# Patient Record
Sex: MALE | Race: WHITE | NOT HISPANIC OR LATINO | URBAN - METROPOLITAN AREA
[De-identification: names, ages, dates, MRNs, and addresses within clinical notes are randomized per-mention and may not be internally consistent; named-entity substitution may affect disease eponyms.]

---

## 2020-08-17 ENCOUNTER — INPATIENT (INPATIENT)
Facility: HOSPITAL | Age: 50
LOS: 0 days | Discharge: LEFT BEFORE DISCHARGE | End: 2020-08-17
Attending: INTERNAL MEDICINE | Admitting: INTERNAL MEDICINE
Payer: MEDICAID

## 2020-08-17 VITALS
HEART RATE: 100 BPM | OXYGEN SATURATION: 98 % | DIASTOLIC BLOOD PRESSURE: 100 MMHG | RESPIRATION RATE: 18 BRPM | TEMPERATURE: 98 F | SYSTOLIC BLOOD PRESSURE: 150 MMHG

## 2020-08-17 VITALS
SYSTOLIC BLOOD PRESSURE: 125 MMHG | RESPIRATION RATE: 18 BRPM | OXYGEN SATURATION: 98 % | DIASTOLIC BLOOD PRESSURE: 72 MMHG | TEMPERATURE: 98 F | HEART RATE: 98 BPM

## 2020-08-17 DIAGNOSIS — R10.9 UNSPECIFIED ABDOMINAL PAIN: ICD-10-CM

## 2020-08-17 DIAGNOSIS — S86.011A STRAIN OF RIGHT ACHILLES TENDON, INITIAL ENCOUNTER: ICD-10-CM

## 2020-08-17 DIAGNOSIS — Z02.9 ENCOUNTER FOR ADMINISTRATIVE EXAMINATIONS, UNSPECIFIED: ICD-10-CM

## 2020-08-17 DIAGNOSIS — K92.2 GASTROINTESTINAL HEMORRHAGE, UNSPECIFIED: ICD-10-CM

## 2020-08-17 DIAGNOSIS — Z29.9 ENCOUNTER FOR PROPHYLACTIC MEASURES, UNSPECIFIED: ICD-10-CM

## 2020-08-17 LAB
ALBUMIN SERPL ELPH-MCNC: 4.9 G/DL — SIGNIFICANT CHANGE UP (ref 3.3–5)
ALP SERPL-CCNC: 69 U/L — SIGNIFICANT CHANGE UP (ref 40–120)
ALT FLD-CCNC: 43 U/L — HIGH (ref 4–41)
ANION GAP SERPL CALC-SCNC: 15 MMO/L — HIGH (ref 7–14)
APTT BLD: 32.7 SEC — SIGNIFICANT CHANGE UP (ref 27–36.3)
AST SERPL-CCNC: 60 U/L — HIGH (ref 4–40)
BASE EXCESS BLDV CALC-SCNC: 6.9 MMOL/L — SIGNIFICANT CHANGE UP
BASOPHILS # BLD AUTO: 0.07 K/UL — SIGNIFICANT CHANGE UP (ref 0–0.2)
BASOPHILS NFR BLD AUTO: 0.5 % — SIGNIFICANT CHANGE UP (ref 0–2)
BILIRUB SERPL-MCNC: 0.7 MG/DL — SIGNIFICANT CHANGE UP (ref 0.2–1.2)
BLD GP AB SCN SERPL QL: NEGATIVE — SIGNIFICANT CHANGE UP
BLOOD GAS VENOUS - CREATININE: 0.98 MG/DL — SIGNIFICANT CHANGE UP (ref 0.5–1.3)
BLOOD GAS VENOUS - FIO2: 21 — SIGNIFICANT CHANGE UP
BUN SERPL-MCNC: 33 MG/DL — HIGH (ref 7–23)
CALCIUM SERPL-MCNC: 9.8 MG/DL — SIGNIFICANT CHANGE UP (ref 8.4–10.5)
CHLORIDE BLDV-SCNC: 98 MMOL/L — SIGNIFICANT CHANGE UP (ref 96–108)
CHLORIDE SERPL-SCNC: 95 MMOL/L — LOW (ref 98–107)
CO2 SERPL-SCNC: 29 MMOL/L — SIGNIFICANT CHANGE UP (ref 22–31)
CREAT SERPL-MCNC: 0.98 MG/DL — SIGNIFICANT CHANGE UP (ref 0.5–1.3)
EOSINOPHIL # BLD AUTO: 0.14 K/UL — SIGNIFICANT CHANGE UP (ref 0–0.5)
EOSINOPHIL NFR BLD AUTO: 1 % — SIGNIFICANT CHANGE UP (ref 0–6)
GAS PNL BLDV: 139 MMOL/L — SIGNIFICANT CHANGE UP (ref 136–146)
GLUCOSE BLDV-MCNC: 109 MG/DL — HIGH (ref 70–99)
GLUCOSE SERPL-MCNC: 112 MG/DL — HIGH (ref 70–99)
HCO3 BLDV-SCNC: 28 MMOL/L — HIGH (ref 20–27)
HCT VFR BLD CALC: 43.9 % — SIGNIFICANT CHANGE UP (ref 39–50)
HCT VFR BLDV CALC: 46.3 % — SIGNIFICANT CHANGE UP (ref 39–51)
HGB BLD-MCNC: 14.4 G/DL — SIGNIFICANT CHANGE UP (ref 13–17)
HGB BLDV-MCNC: 15.1 G/DL — SIGNIFICANT CHANGE UP (ref 13–17)
IMM GRANULOCYTES NFR BLD AUTO: 0.4 % — SIGNIFICANT CHANGE UP (ref 0–1.5)
INR BLD: 1.12 — SIGNIFICANT CHANGE UP (ref 0.88–1.16)
LACTATE BLDV-MCNC: 1 MMOL/L — SIGNIFICANT CHANGE UP (ref 0.5–2)
LIDOCAIN IGE QN: 24.4 U/L — SIGNIFICANT CHANGE UP (ref 7–60)
LYMPHOCYTES # BLD AUTO: 23.8 % — SIGNIFICANT CHANGE UP (ref 13–44)
LYMPHOCYTES # BLD AUTO: 3.21 K/UL — SIGNIFICANT CHANGE UP (ref 1–3.3)
MCHC RBC-ENTMCNC: 28.6 PG — SIGNIFICANT CHANGE UP (ref 27–34)
MCHC RBC-ENTMCNC: 32.8 % — SIGNIFICANT CHANGE UP (ref 32–36)
MCV RBC AUTO: 87.1 FL — SIGNIFICANT CHANGE UP (ref 80–100)
MONOCYTES # BLD AUTO: 1.35 K/UL — HIGH (ref 0–0.9)
MONOCYTES NFR BLD AUTO: 10 % — SIGNIFICANT CHANGE UP (ref 2–14)
NEUTROPHILS # BLD AUTO: 8.69 K/UL — HIGH (ref 1.8–7.4)
NEUTROPHILS NFR BLD AUTO: 64.3 % — SIGNIFICANT CHANGE UP (ref 43–77)
NRBC # FLD: 0 K/UL — SIGNIFICANT CHANGE UP (ref 0–0)
PCO2 BLDV: 60 MMHG — HIGH (ref 41–51)
PH BLDV: 7.35 PH — SIGNIFICANT CHANGE UP (ref 7.32–7.43)
PLATELET # BLD AUTO: 268 K/UL — SIGNIFICANT CHANGE UP (ref 150–400)
PMV BLD: 10.8 FL — SIGNIFICANT CHANGE UP (ref 7–13)
PO2 BLDV: 29 MMHG — LOW (ref 35–40)
POTASSIUM BLDV-SCNC: 3.2 MMOL/L — LOW (ref 3.4–4.5)
POTASSIUM SERPL-MCNC: 3.5 MMOL/L — SIGNIFICANT CHANGE UP (ref 3.5–5.3)
POTASSIUM SERPL-SCNC: 3.5 MMOL/L — SIGNIFICANT CHANGE UP (ref 3.5–5.3)
PROT SERPL-MCNC: 7.4 G/DL — SIGNIFICANT CHANGE UP (ref 6–8.3)
PROTHROM AB SERPL-ACNC: 12.7 SEC — SIGNIFICANT CHANGE UP (ref 10.6–13.6)
RBC # BLD: 5.04 M/UL — SIGNIFICANT CHANGE UP (ref 4.2–5.8)
RBC # FLD: 13.1 % — SIGNIFICANT CHANGE UP (ref 10.3–14.5)
RH IG SCN BLD-IMP: POSITIVE — SIGNIFICANT CHANGE UP
SAO2 % BLDV: 55.9 % — LOW (ref 60–85)
SARS-COV-2 RNA SPEC QL NAA+PROBE: SIGNIFICANT CHANGE UP
SODIUM SERPL-SCNC: 139 MMOL/L — SIGNIFICANT CHANGE UP (ref 135–145)
WBC # BLD: 13.51 K/UL — HIGH (ref 3.8–10.5)
WBC # FLD AUTO: 13.51 K/UL — HIGH (ref 3.8–10.5)

## 2020-08-17 PROCEDURE — 99223 1ST HOSP IP/OBS HIGH 75: CPT | Mod: GC

## 2020-08-17 PROCEDURE — 74177 CT ABD & PELVIS W/CONTRAST: CPT | Mod: 26

## 2020-08-17 PROCEDURE — 99285 EMERGENCY DEPT VISIT HI MDM: CPT

## 2020-08-17 RX ORDER — SODIUM CHLORIDE 9 MG/ML
1000 INJECTION INTRAMUSCULAR; INTRAVENOUS; SUBCUTANEOUS ONCE
Refills: 0 | Status: COMPLETED | OUTPATIENT
Start: 2020-08-17 | End: 2020-08-17

## 2020-08-17 RX ORDER — MORPHINE SULFATE 50 MG/1
4 CAPSULE, EXTENDED RELEASE ORAL ONCE
Refills: 0 | Status: DISCONTINUED | OUTPATIENT
Start: 2020-08-17 | End: 2020-08-17

## 2020-08-17 RX ORDER — OXYCODONE HYDROCHLORIDE 5 MG/1
0 TABLET ORAL
Qty: 0 | Refills: 0 | DISCHARGE

## 2020-08-17 RX ORDER — NICOTINE POLACRILEX 2 MG
1 GUM BUCCAL DAILY
Refills: 0 | Status: DISCONTINUED | OUTPATIENT
Start: 2020-08-17 | End: 2020-08-17

## 2020-08-17 RX ORDER — OXYCODONE HYDROCHLORIDE 5 MG/1
30 TABLET ORAL EVERY 8 HOURS
Refills: 0 | Status: DISCONTINUED | OUTPATIENT
Start: 2020-08-17 | End: 2020-08-17

## 2020-08-17 RX ORDER — PANTOPRAZOLE SODIUM 20 MG/1
8 TABLET, DELAYED RELEASE ORAL
Qty: 80 | Refills: 0 | Status: DISCONTINUED | OUTPATIENT
Start: 2020-08-17 | End: 2020-08-17

## 2020-08-17 RX ORDER — ONDANSETRON 8 MG/1
4 TABLET, FILM COATED ORAL ONCE
Refills: 0 | Status: COMPLETED | OUTPATIENT
Start: 2020-08-17 | End: 2020-08-17

## 2020-08-17 RX ORDER — MORPHINE SULFATE 50 MG/1
2 CAPSULE, EXTENDED RELEASE ORAL ONCE
Refills: 0 | Status: DISCONTINUED | OUTPATIENT
Start: 2020-08-17 | End: 2020-08-17

## 2020-08-17 RX ORDER — FAMOTIDINE 10 MG/ML
20 INJECTION INTRAVENOUS ONCE
Refills: 0 | Status: COMPLETED | OUTPATIENT
Start: 2020-08-17 | End: 2020-08-17

## 2020-08-17 RX ADMIN — MORPHINE SULFATE 2 MILLIGRAM(S): 50 CAPSULE, EXTENDED RELEASE ORAL at 12:25

## 2020-08-17 RX ADMIN — SODIUM CHLORIDE 1000 MILLILITER(S): 9 INJECTION INTRAMUSCULAR; INTRAVENOUS; SUBCUTANEOUS at 09:44

## 2020-08-17 RX ADMIN — Medication 1 PATCH: at 15:45

## 2020-08-17 RX ADMIN — MORPHINE SULFATE 4 MILLIGRAM(S): 50 CAPSULE, EXTENDED RELEASE ORAL at 10:40

## 2020-08-17 RX ADMIN — FAMOTIDINE 20 MILLIGRAM(S): 10 INJECTION INTRAVENOUS at 09:44

## 2020-08-17 RX ADMIN — SODIUM CHLORIDE 1000 MILLILITER(S): 9 INJECTION INTRAMUSCULAR; INTRAVENOUS; SUBCUTANEOUS at 12:25

## 2020-08-17 RX ADMIN — Medication 1 PATCH: at 15:15

## 2020-08-17 RX ADMIN — MORPHINE SULFATE 4 MILLIGRAM(S): 50 CAPSULE, EXTENDED RELEASE ORAL at 09:44

## 2020-08-17 RX ADMIN — PANTOPRAZOLE SODIUM 10 MG/HR: 20 TABLET, DELAYED RELEASE ORAL at 12:25

## 2020-08-17 RX ADMIN — MORPHINE SULFATE 2 MILLIGRAM(S): 50 CAPSULE, EXTENDED RELEASE ORAL at 11:01

## 2020-08-17 RX ADMIN — ONDANSETRON 4 MILLIGRAM(S): 8 TABLET, FILM COATED ORAL at 09:43

## 2020-08-17 NOTE — DISCHARGE NOTE PROVIDER - NSDCCPCAREPLAN_GEN_ALL_CORE_FT
PRINCIPAL DISCHARGE DIAGNOSIS  Diagnosis: Hematemesis  Assessment and Plan of Treatment: This is secondary to what is likely an upper GI bleed. We are concerned for cancer given CT scan results. It has been recommended that you undergo EGD/EUS scoping to look at the GI tract.

## 2020-08-17 NOTE — H&P ADULT - ATTENDING COMMENTS
Patient was seen and examined personally by me. I have discussed the plan and reviewed the Resident's note and agree with the above physical exam findings including assessment and plan except as indicated below. Labs and imagining reviewed.     50M with no PMH presented with hematemesis/coffee ground emesis. First occurrence, endorsed history of GERD. +FH malignancy. denied dysphagia or weight loss. GI input appreciated. monitor h/h, PPI. plan for EGD tomorrow. Patient states he does not want to stay at the hospital. His GI symptoms had resolved. he wants to follow up with his PCP. I explained to him that he has not had any repeated blood work and he could still be bleeding internally. Patient is alert and oriented. he understanding the risk of leaving the hospital against medical advice, including bleeding out and possibility of death. He reports understanding. AMA form signed with LAI Florez as witness. IV removed, patient left AMA.

## 2020-08-17 NOTE — H&P ADULT - ASSESSMENT
Patient is 50yoM with no major PMHx, 30 pack/year smoking history and family history of cancer here with epigastric pain, hematemesis, and melena. Concern for UGIB, ulcer vs malignancy, vs mucosal tear. Malignancy supported by CT scan with prominent  gastrohepatic LNs and questionable RLL pulmonary nodule noted on CT a/p

## 2020-08-17 NOTE — ED ADULT NURSE NOTE - OBJECTIVE STATEMENT
50-year-old male ambulatory presents to ED complaining of epigastric pain since last night.  Pt states he vomited ~10x last night, the last episode was "black."  Pt states he had a pelvic fracture in 2007 with mesh placed for hernia repair, now stating "I think the mesh broke."  Denies fever, chills, chest pain.

## 2020-08-17 NOTE — ED PROVIDER NOTE - ATTENDING CONTRIBUTION TO CARE
I performed a history and physical exam of the patient and discussed their management with the PA. I reviewed the PA's note and agree with the documented findings and plan of care. I have edited as appropriate. My medical decision making and observations are found above.  49 y/o male no pmh c/o abd pain x1 day. Pt admits to epigastric/LUQ abd pain, worse with palpation, no relieving factors. pt admits to nausea and multiple episodes of emesis. Pt states that at first the emesis was normal color but the last episode was black. Pt states that he had a normal BM yesterday. Pt admits to drinking ETOH about once every 3 weeks. Pt denies chest pain, sob, diarrhea, dysuria, numbness, tingling, weakness, dizziness, syncope, fever or chills.

## 2020-08-17 NOTE — DISCHARGE NOTE PROVIDER - HOSPITAL COURSE
HPI:    Patient is a 51yo M with recent achilles tendon injury, no other Hx, presenting for blood-tinged emesis x1 and black, tarry emesis x1 and melena x1 yesterday evening accompanied by epigastric pain. Says a few emetic episodes were violent. Patient recently at [facility], which he thinks may be the source of his n/v, despite no one else reporting illness. Patient denies sick contacts, history of ulcers, family history of GI cancers. Patient does have a significant family history of other malignancy in the family. 30-pack year smoker, some EtOH use. Denies recent trauma, ingestion, fever, chills. Last BM yesterday with loose, tarry black BM. Some abdominal discomfort in right and left colonic regions today. (17 Aug 2020 15:18)        Started on protonix gtt. Seen by GI, who are concerned for SCC. Recommended EGD/EUS. Patient decided to discuss with karlie at home, and was angry he couldn't smoke in the hospital. Signed out AMA.         Patient signed out AMA from hospital on 8/17 in stable condition.

## 2020-08-17 NOTE — H&P ADULT - NSHPREVIEWOFSYSTEMS_GEN_ALL_CORE
REVIEW OF SYSTEMS      General:	-distress, intoxication    Skin/Breast: -rash, lesions  	  Ophthalmologic: -changes in vison, pain  	  ENMT: -sore throat, bleeding    Respiratory and Thorax: -SOB, cough  	  Cardiovascular: -chest pain, palpitations    Gastrointestinal:	+episgastric and lateral right and left pain, n/v/d, melena, hematemesis; -jaundice    Genitourinary: -changes in urinary habits, pain    Musculoskeletal:	+pain from R achilles injury; -other trauma, pain    Neurological: -headache, weakness

## 2020-08-17 NOTE — ED ADULT TRIAGE NOTE - CHIEF COMPLAINT QUOTE
p/t c/o of diffuse abd pain with nausea and vomiting since last night, p/t very poor historian, nad noted

## 2020-08-17 NOTE — H&P ADULT - NSHPPHYSICALEXAM_GEN_ALL_CORE
PHYSICAL EXAM:      Constitutional: NAD, well-developed male    Eyes: No conjunctival injection, EOMI, PERRLA    ENMT: No oropharyngeal erythema, normal nose and ears    Respiratory: CTAB, no wheezing, rhonchi. Good inspiratory effort.     Cardiovascular: RRR, no murmurs, rubs, gallops. Limbs warm and well-perfused. 2+ pulses b/l extremities    Gastrointestinal: Soft, mildly tender to deep palpation in right and left lateral colonic regions. Normoactive bowel sounds. Ventral abdominal wall hernia, reducible, bowel sounds.     Neurological: No focal deficits, moving all limbs.     Skin: No rashes, lesions    Musculoskeletal: Swelling in RLE 2/2 to achilles tendon injury. No gross deformity    Psychiatric: AOx3, normal affect

## 2020-08-17 NOTE — H&P ADULT - HISTORY OF PRESENT ILLNESS
Patient is a 49yo M with recent achilles tendon injury, no other Hx, presenting for blood-tinged emesis x1 and black, tarry emesis x1 and melena x1 yesterday evening accompanied by epigastric pain. Says a few emetic episodes were violent. Patient recently at [facility], which he thinks may be the source of his n/v, despite no one else reporting illness. Patient denies sick contacts, history of ulcers, family history of GI cancers. Patient does have a significant family history of other malignancy in the family. 30-pack year smoker, some EtOH use. Denies recent trauma, ingestion, fever, chills. Last BM yesterday with loose, tarry black BM. Some abdominal discomfort in right and left colonic regions today.

## 2020-08-17 NOTE — H&P ADULT - PROBLEM SELECTOR PLAN 4
Dispo:   1.  Name of PCP:   2.  PCP Contacted on Admission: [ ] Y    [ ] N    3.  PCP contacted at Discharge: [ ] Y    [ ] N    [ ] N/A  4.  Post-Discharge Appointment Date and Location:  5.  Summary of Handoff given to PCP: Dispo:   1.  Name of PCP: Effie Art ([phone])  2.  PCP Contacted on Admission: [ ] Y    [x] N    3.  PCP contacted at Discharge: [ ] Y    [ ] N    [ ] N/A  4.  Post-Discharge Appointment Date and Location:  5.  Summary of Handoff given to PCP:

## 2020-08-17 NOTE — CONSULT NOTE ADULT - ASSESSMENT
Impression:  1) Esophogeal wall thickening -     Recommendations:   - monitor hemoglobin   - transfuse as needed   - two active IVs at all times   - active type and screen   - keep NPO for now   - IV PPI BID   - supportive per primary    Rosario Koo  Gastroenterology Fellow  Pager x [phone] or [phone]  (From 8 am - 5 pm or on weekends and holidays please page GI on call at [phone]) Impression:  1) Esophogeal wall thickening - given significant smoking history and gastric nodes concerning for squamous cell carcinoma.  DDx also includes esophagitis and other esophogeal malignancies such as adenocarcinoma.    Recommendations:   - monitor hemoglobin   - transfuse as needed   - two active IVs at all times   - active type and screen   - keep NPO for now   - IV PPI BID   - supportive per primary   - would recommend EGD +/- EUS tomorrow      Rosario Koo  Gastroenterology Fellow  Pager x [phone] or [phone]  (From 8 am - 5 pm or on weekends and holidays please page GI on call at [phone])

## 2020-08-17 NOTE — CONSULT NOTE ADULT - ATTENDING COMMENTS
Esophageal wall thickening. Nausea vomiting, periesophageal lymph node.    Rule out esophageal CA. Rule out esophagitis, rule out Aggie-Macdonald tear.    Plan: EGD with possible EUS.

## 2020-08-17 NOTE — H&P ADULT - NSHPLABSRESULTS_GEN_ALL_CORE
14.4   13.51 )-----------( 268      ( 17 Aug 2020 09:30 )             43.9     08-17    139  |  95<L>  |  33<H>  ----------------------------<  112<H>  3.5   |  29  |  0.98    Ca    9.8      17 Aug 2020 09:30    TPro  7.4  /  Alb  4.9  /  TBili  0.7  /  DBili  x   /  AST  60<H>  /  ALT  43<H>  /  AlkPhos  69  08-17    EXAM:  CT ABDOMEN AND PELVIS IC        PROCEDURE DATE:  Aug 17 2020         INTERPRETATION:  CLINICAL INFORMATION: Abdominal pain and vomiting.    COMPARISON: None.    PROCEDURE:  CT of the Abdomen and Pelvis was performed with intravenous contrast.  Intravenous contrast: 90 ml Omnipaque 350. 10 ml discarded.  Oral contrast: None.  Sagittal and coronal reformats were performed.    FINDINGS:  LOWER CHEST: 8 mm right lower lobe pulmonary nodule (2, 13).    LIVER: Within normal limits.  BILE DUCTS: Normal caliber.  GALLBLADDER: Within normal limits.  SPLEEN: Within normal limits.  PANCREAS: Within normal limits.  ADRENALS: Within normal limits.  KIDNEYS/URETERS: Within normal limits.    BLADDER: Within normal limits.  REPRODUCTIVE ORGANS: Prostate within normal limits.    BOWEL: Wall thickening of the distal esophagus. No bowel obstruction. Colonic diverticulosis without diverticulitis. Appendix is not visualized. No evidence of inflammation in the pericecal region.  PERITONEUM: No ascites.  VESSELS: Within normal limits.  RETROPERITONEUM/LYMPH NODES: Prominent gastrohepatic lymph nodes, measuring up to 1 cm in short axis (2, 31).  ABDOMINAL WALL: Status post right inguinal hernia repair.  BONES: Degenerative changes. Chronic right pubic bone fracture deformity. Nonspecific sclerotic foci in the pelvic bones and femurs.    IMPRESSION:    Distal esophageal wall thickening and prominent gastrohepatic lymph nodes. Correlation with endoscopy is recommended to exclude neoplasm.    8mm right lower lobe pulmonary nodule. Chest CT can be performed for further evaluation.

## 2020-08-17 NOTE — ED ADULT NURSE NOTE - NSIMPLEMENTINTERV_GEN_ALL_ED
Implemented All Universal Safety Interventions:  [place] to call system. Call bell, personal items and telephone within reach. Instruct patient to call for assistance. Room bathroom lighting operational. Non-slip footwear when patient is off stretcher. Physically safe environment: no spills, clutter or unnecessary equipment. Stretcher in lowest position, wheels locked, appropriate side rails in place.

## 2020-08-17 NOTE — CONSULT NOTE ADULT - SUBJECTIVE AND OBJECTIVE BOX
Chief Complaint:  Patient is a 50y old  Male who presents with a chief complaint of     HPI:  The patient is a 51 y/o male no PMHx c/o abd pain x1 day. The patient notes that he ahd one day of   epigastric/LUQ abd pain, worse with palpation, no relieving factors with associated nausea and multiple episodes of emesis. Pt states that at first the emesis was normal color but the last episode was black in color. Pt states that he had a normal BM yesterday. Pt admits to drinking ETOH about once every 3 weeks. Pt denies chest pain, sob, diarrhea, dysuria, numbness, tingling, weakness, dizziness, syncope, fever or chills.    In the ED VSS hemoglobin     Allergies:  No Known Allergies      Home Medications:    Hospital Medications:  nicotine - 21 mG/24Hr(s) Patch 1 patch Transdermal daily  pantoprazole Infusion 8 mG/Hr IV Continuous <Continuous>      PMHX/PSHX:  Inguinal hernia  H/O hernia repair  Pelvic fracture      Family history:      Social History:     ROS:     General:  No wt loss, fevers, chills, night sweats, fatigue,   Eyes:  Good vision, no reported pain  ENT:  No sore throat, pain, runny nose, dysphagia  CV:  No pain, palpitations, hypo/hypertension  Resp:  No dyspnea, cough, tachypnea, wheezing  GI:  See HPI  :  No pain, bleeding, incontinence, nocturia  Muscle:  No pain, weakness  Neuro:  No weakness, tingling, memory problems  Psych:  No fatigue, insomnia, mood problems, depression  Endocrine:  No polyuria, polydipsia, cold/heat intolerance  Heme:  No petechiae, ecchymosis, easy bruisability  Skin:  No rash, edema      PHYSICAL EXAM:     GENERAL:  NAD  CHEST:  Full & symmetric excursion  HEART:  Regular rhythm, no abdominal bruit, no edema  ABDOMEN:  Soft, non-tender, non-distended, normoactive bowel sounds,  no masses , no hepatosplenomegaly  EXTREMITIES:  no cyanosis,clubbing or edema  SKIN:  No rash/erythema/ecchymoses/petechiae/wounds/abscess/warm/dry  NEURO:  Alert, oriented    Vital Signs:  Vital Signs Last 24 Hrs  T(C): 36.7 (17 Aug 2020 13:38), Max: 36.9 (17 Aug 2020 08:28)  T(F): 98 (17 Aug 2020 13:38), Max: 98.5 (17 Aug 2020 08:28)  HR: 98 (17 Aug 2020 13:38) (88 - 100)  BP: 125/72 (17 Aug 2020 13:38) (125/72 - 156/96)  BP(mean): --  RR: 18 (17 Aug 2020 13:38) (18 - 20)  SpO2: 98% (17 Aug 2020 13:38) (95% - 98%)  Daily     Daily     LABS:                        14.4   13.51 )-----------( 268      ( 17 Aug 2020 09:30 )             43.9     08-17    139  |  95<L>  |  33<H>  ----------------------------<  112<H>  3.5   |  29  |  0.98    Ca    9.8      17 Aug 2020 09:30    TPro  7.4  /  Alb  4.9  /  TBili  0.7  /  DBili  x   /  AST  60<H>  /  ALT  43<H>  /  AlkPhos  69  08-17    LIVER FUNCTIONS - ( 17 Aug 2020 09:30 )  Alb: 4.9 g/dL / Pro: 7.4 g/dL / ALK PHOS: 69 u/L / ALT: 43 u/L / AST: 60 u/L / GGT: x           PT/INR - ( 17 Aug 2020 09:30 )   PT: 12.7 SEC;   INR: 1.12          PTT - ( 17 Aug 2020 09:30 )  PTT:32.7 SEC    Amylase Serum--      Lipase serum24.4       Ammonia--      Imaging: Chief Complaint:  Patient is a 50y old  Male who presents with a chief complaint of     HPI:  The patient is a 51 y/o male no PMHx c/o abd pain x1 day. The patient notes that he ahd one day of   epigastric/LUQ abd pain, worse with palpation, no relieving factors with associated nausea and multiple episodes of emesis. Pt states that at first the emesis was normal color but the last episode was black in color. Pt states that he had a normal BM yesterday. Pt admits to drinking ETOH about once every 3 weeks. Pt denies chest pain, sob, diarrhea, dysuria, numbness, tingling, weakness, dizziness, syncope, fever or chills.    In the ED VSS hemoglobin 14.4 BUN of 33.  Patient had a CT with Distal esophageal wall thickening and prominent gastrohepatic lymph nodes. Correlation with endoscopy is recommended to exclude neoplasm. 8mm right lower lobe pulmonary nodule. Chest CT can be performed for further evaluation.    Allergies:  No Known Allergies      Home Medications:    Hospital Medications:  nicotine - 21 mG/24Hr(s) Patch 1 patch Transdermal daily  pantoprazole Infusion 8 mG/Hr IV Continuous <Continuous>      PMHX/PSHX:  Inguinal hernia  H/O hernia repair  Pelvic fracture      Family history:      Social History:     ROS:     General:  No wt loss, fevers, chills, night sweats, fatigue,   Eyes:  Good vision, no reported pain  ENT:  No sore throat, pain, runny nose, dysphagia  CV:  No pain, palpitations, hypo/hypertension  Resp:  No dyspnea, cough, tachypnea, wheezing  GI:  See HPI  :  No pain, bleeding, incontinence, nocturia  Muscle:  No pain, weakness  Neuro:  No weakness, tingling, memory problems  Psych:  No fatigue, insomnia, mood problems, depression  Endocrine:  No polyuria, polydipsia, cold/heat intolerance  Heme:  No petechiae, ecchymosis, easy bruisability  Skin:  No rash, edema      PHYSICAL EXAM:     GENERAL:  NAD  CHEST:  Full & symmetric excursion  HEART:  Regular rhythm, no abdominal bruit, no edema  ABDOMEN:  Soft, non-tender, non-distended, normoactive bowel sounds,  no masses , no hepatosplenomegaly  EXTREMITIES:  no cyanosis,clubbing or edema  SKIN:  No rash/erythema/ecchymoses/petechiae/wounds/abscess/warm/dry  NEURO:  Alert, oriented    Vital Signs:  Vital Signs Last 24 Hrs  T(C): 36.7 (17 Aug 2020 13:38), Max: 36.9 (17 Aug 2020 08:28)  T(F): 98 (17 Aug 2020 13:38), Max: 98.5 (17 Aug 2020 08:28)  HR: 98 (17 Aug 2020 13:38) (88 - 100)  BP: 125/72 (17 Aug 2020 13:38) (125/72 - 156/96)  BP(mean): --  RR: 18 (17 Aug 2020 13:38) (18 - 20)  SpO2: 98% (17 Aug 2020 13:38) (95% - 98%)  Daily     Daily     LABS:                        14.4   13.51 )-----------( 268      ( 17 Aug 2020 09:30 )             43.9     08-17    139  |  95<L>  |  33<H>  ----------------------------<  112<H>  3.5   |  29  |  0.98    Ca    9.8      17 Aug 2020 09:30    TPro  7.4  /  Alb  4.9  /  TBili  0.7  /  DBili  x   /  AST  60<H>  /  ALT  43<H>  /  AlkPhos  69  08-17    LIVER FUNCTIONS - ( 17 Aug 2020 09:30 )  Alb: 4.9 g/dL / Pro: 7.4 g/dL / ALK PHOS: 69 u/L / ALT: 43 u/L / AST: 60 u/L / GGT: x           PT/INR - ( 17 Aug 2020 09:30 )   PT: 12.7 SEC;   INR: 1.12          PTT - ( 17 Aug 2020 09:30 )  PTT:32.7 SEC    Amylase Serum--      Lipase serum24.4       Ammonia--      Imaging: Chief Complaint:  Patient is a 50y old  Male who presents with a chief complaint of     HPI:  The patient is a 49 y/o male no PMHx c/o abd pain x1 day. The patient notes that he was in his [facility] until yesterday when he went to a [facility], he ate a pink suasage and then 2 hours later started having N/V and abdominal pain.  No one else was sick and he has no recent travel.  He stated that the pain worse with palpation, no relieving factors with associated nausea and multiple episodes of emesis all night with two loose BMs. Pt states that at first the emesis was normal color but the last episode was black in color, does not think there was blood.  Pt admits to drinking ETOH about once every 3 weeks. Pt denies chest pain, sob, diarrhea, dysuria, numbness, tingling, weakness, dizziness, syncope, fever or chills.  Of note the patient has a 30 pack year smoking history and is currently smoking.  His family history is significant for breast and bone cancer on his mothers side.  Patient denies any dysphagia, early satiety or weight loss.    In the ED VSS hemoglobin 14.4 BUN of 33.  Patient had a CT with Distal esophageal wall thickening and prominent gastrohepatic lymph nodes. Correlation with endoscopy is recommended to exclude neoplasm. 8mm right lower lobe pulmonary nodule. Chest CT can be performed for further evaluation.  Patient is insistent that he wants to leave AMA because his wife is under a lot of stress.  Of note he ate a full breakfast without issue.     Allergies:  No Known Allergies      Home Medications:    Hospital Medications:  nicotine - 21 mG/24Hr(s) Patch 1 patch Transdermal daily  pantoprazole Infusion 8 mG/Hr IV Continuous <Continuous>      PMHX/PSHX:  Inguinal hernia  H/O hernia repair  Pelvic fracture      Family history:      Social History:     ROS:     General:  No wt loss, fevers, chills, night sweats, fatigue,   Eyes:  Good vision, no reported pain  ENT:  No sore throat, pain, runny nose, dysphagia  CV:  No pain, palpitations, hypo/hypertension  Resp:  No dyspnea, cough, tachypnea, wheezing  GI:  See HPI  :  No pain, bleeding, incontinence, nocturia  Muscle:  No pain, weakness  Neuro:  No weakness, tingling, memory problems  Psych:  No fatigue, insomnia, mood problems, depression  Endocrine:  No polyuria, polydipsia, cold/heat intolerance  Heme:  No petechiae, ecchymosis, easy bruisability  Skin:  No rash, edema      PHYSICAL EXAM:     GENERAL:  NAD  CHEST:  Full & symmetric excursion  HEART:  Regular rhythm, no abdominal bruit, no edema  ABDOMEN:  Soft, non-tender, non-distended, normoactive bowel sounds,  no masses , no hepatosplenomegaly  EXTREMITIES:  no cyanosis,clubbing or edema  SKIN:  No rash/erythema/ecchymoses/petechiae/wounds/abscess/warm/dry  NEURO:  Alert, oriented    Vital Signs:  Vital Signs Last 24 Hrs  T(C): 36.7 (17 Aug 2020 13:38), Max: 36.9 (17 Aug 2020 08:28)  T(F): 98 (17 Aug 2020 13:38), Max: 98.5 (17 Aug 2020 08:28)  HR: 98 (17 Aug 2020 13:38) (88 - 100)  BP: 125/72 (17 Aug 2020 13:38) (125/72 - 156/96)  BP(mean): --  RR: 18 (17 Aug 2020 13:38) (18 - 20)  SpO2: 98% (17 Aug 2020 13:38) (95% - 98%)  Daily     Daily     LABS:                        14.4   13.51 )-----------( 268      ( 17 Aug 2020 09:30 )             43.9     08-17    139  |  95<L>  |  33<H>  ----------------------------<  112<H>  3.5   |  29  |  0.98    Ca    9.8      17 Aug 2020 09:30    TPro  7.4  /  Alb  4.9  /  TBili  0.7  /  DBili  x   /  AST  60<H>  /  ALT  43<H>  /  AlkPhos  69  08-17    LIVER FUNCTIONS - ( 17 Aug 2020 09:30 )  Alb: 4.9 g/dL / Pro: 7.4 g/dL / ALK PHOS: 69 u/L / ALT: 43 u/L / AST: 60 u/L / GGT: x           PT/INR - ( 17 Aug 2020 09:30 )   PT: 12.7 SEC;   INR: 1.12          PTT - ( 17 Aug 2020 09:30 )  PTT:32.7 SEC    Amylase Serum--      Lipase serum24.4       Ammonia--      Imaging:

## 2020-08-17 NOTE — ED PROVIDER NOTE - OBJECTIVE STATEMENT
49 y/o male no pmh c/o abd pain x1 day. Pt admits to epigastric/LUQ abd pain, worse with palpation, no relieving factors. pt admits to nausea and multiple episodes of emesis. Pt states that at first the emesis was normal color but the last episode was black. Pt states that he had a normal BM yesterday. Pt admits to drinking ETOH about once every 3 weeks. Pt denies chest pain, sob, diarrhea, dysuria, numbness, tingling, weakness, dizziness, syncope, fever or chills.

## 2020-08-17 NOTE — CHART NOTE - NSCHARTNOTEFT_GEN_A_CORE
Gastroenterology Brief Note   - spoke with patient extensively about need to stay for EGD tomorrow   - patient wants to leave AMA   - told patient in best benefit to stay, not willing to consider

## 2020-10-15 ENCOUNTER — EMERGENCY (EMERGENCY)
Facility: HOSPITAL | Age: 50
LOS: 1 days | Discharge: HOME | End: 2020-10-15
Attending: EMERGENCY MEDICINE | Admitting: EMERGENCY MEDICINE
Payer: MEDICAID

## 2020-10-15 VITALS
TEMPERATURE: 98 F | SYSTOLIC BLOOD PRESSURE: 138 MMHG | HEART RATE: 95 BPM | OXYGEN SATURATION: 100 % | DIASTOLIC BLOOD PRESSURE: 85 MMHG | RESPIRATION RATE: 18 BRPM

## 2020-10-15 PROCEDURE — 29130 APPL FINGER SPLINT STATIC: CPT | Mod: F9

## 2020-10-15 PROCEDURE — 99284 EMERGENCY DEPT VISIT MOD MDM: CPT | Mod: 25

## 2020-10-15 RX ORDER — IBUPROFEN 200 MG
600 TABLET ORAL ONCE
Refills: 0 | Status: COMPLETED | OUTPATIENT
Start: 2020-10-15 | End: 2020-10-15

## 2020-10-15 RX ORDER — TETANUS TOXOID, REDUCED DIPHTHERIA TOXOID AND ACELLULAR PERTUSSIS VACCINE, ADSORBED 5; 2.5; 8; 8; 2.5 [IU]/.5ML; [IU]/.5ML; UG/.5ML; UG/.5ML; UG/.5ML
0.5 SUSPENSION INTRAMUSCULAR ONCE
Refills: 0 | Status: COMPLETED | OUTPATIENT
Start: 2020-10-15 | End: 2020-10-15

## 2020-10-15 NOTE — ED ADULT TRIAGE NOTE - CHIEF COMPLAINT QUOTE
PT C/O dizziness, headache x 2 days, right hand 5 th digit laceration x 2 days. Pt states 2 days ago fell off ladder approximately 10 feet high; was seen at [facility] has 2 staples to laceration in posterior head wound approximated with no active bleeding. PT has 3 cm laceration to right hand 5th digit not approximated without sutures: no active bleeding, wound dressing changed. Denies CP, SOB, visual changes, nausea, vomiting.

## 2020-10-15 NOTE — ED ADULT NURSE NOTE - OBJECTIVE STATEMENT
Received PT to trauma-C, A&Ox3, c/o of right hand - pinky, back of head, and right foot pain, 10/10. PT states he fell off ladder 2 days ago, went to [facility]. States they stapled head and dressed hand with kerlix. PT states came to ED due to dizziness, nausea, and swelling to right foot. Denies loss of consciousness after fall, chest pain, SOB, abdominal pain, weakness, fatigue. Will continue to monitor

## 2020-10-15 NOTE — ED ADULT NURSE NOTE - CHIEF COMPLAINT QUOTE
PT C/O dizziness, headache x 2 days, right hand 5 th digit laceration x 2 days. Pt states 2 days ago fell off ladder approximately 10 feet high; was seen at [facility] has 2 staples to laceration in posterior head wound approximated with no active bleeding. PT has 3 cm laceration to right hand 5th digit not approximated without sutures: no active bleeding, wound dressing changed. Denies CP, SOB, visual changes, nausea, vomiting.

## 2020-10-15 NOTE — ED ADULT NURSE NOTE - NSIMPLEMENTINTERV_GEN_ALL_ED
Implemented All Fall Risk Interventions:  [place] to call system. Call bell, personal items and telephone within reach. Instruct patient to call for assistance. Room bathroom lighting operational. Non-slip footwear when patient is off stretcher. Physically safe environment: no spills, clutter or unnecessary equipment. Stretcher in lowest position, wheels locked, appropriate side rails in place. Provide visual cue, wrist band, yellow gown, etc. Monitor gait and stability. Monitor for mental status changes and reorient to person, place, and time. Review medications for side effects contributing to fall risk. Reinforce activity limits and safety measures with patient and family.

## 2020-10-16 VITALS
TEMPERATURE: 99 F | DIASTOLIC BLOOD PRESSURE: 80 MMHG | OXYGEN SATURATION: 97 % | HEART RATE: 95 BPM | SYSTOLIC BLOOD PRESSURE: 133 MMHG | RESPIRATION RATE: 18 BRPM

## 2020-10-16 PROBLEM — Z98.890 OTHER SPECIFIED POSTPROCEDURAL STATES: Chronic | Status: ACTIVE | Noted: 2020-08-17

## 2020-10-16 PROBLEM — S32.9XXA FRACTURE OF UNSPECIFIED PARTS OF LUMBOSACRAL SPINE AND PELVIS, INITIAL ENCOUNTER FOR CLOSED FRACTURE: Chronic | Status: ACTIVE | Noted: 2020-08-17

## 2020-10-16 PROBLEM — K40.90 UNILATERAL INGUINAL HERNIA, WITHOUT OBSTRUCTION OR GANGRENE, NOT SPECIFIED AS RECURRENT: Chronic | Status: ACTIVE | Noted: 2020-08-17

## 2020-10-16 PROCEDURE — 73140 X-RAY EXAM OF FINGER(S): CPT | Mod: 26,RT

## 2020-10-16 PROCEDURE — 70450 CT HEAD/BRAIN W/O DYE: CPT | Mod: 26

## 2020-10-16 PROCEDURE — 73630 X-RAY EXAM OF FOOT: CPT | Mod: 26,RT

## 2020-10-16 RX ORDER — ACETAMINOPHEN 500 MG
650 TABLET ORAL ONCE
Refills: 0 | Status: COMPLETED | OUTPATIENT
Start: 2020-10-16 | End: 2020-10-16

## 2020-10-16 RX ORDER — MECLIZINE HCL 12.5 MG
25 TABLET ORAL ONCE
Refills: 0 | Status: COMPLETED | OUTPATIENT
Start: 2020-10-16 | End: 2020-10-16

## 2020-10-16 RX ORDER — NICOTINE POLACRILEX 2 MG
1 GUM BUCCAL DAILY
Refills: 0 | Status: DISCONTINUED | OUTPATIENT
Start: 2020-10-16 | End: 2020-10-19

## 2020-10-16 RX ADMIN — TETANUS TOXOID, REDUCED DIPHTHERIA TOXOID AND ACELLULAR PERTUSSIS VACCINE, ADSORBED 0.5 MILLILITER(S): 5; 2.5; 8; 8; 2.5 SUSPENSION INTRAMUSCULAR at 00:20

## 2020-10-16 RX ADMIN — Medication 25 MILLIGRAM(S): at 03:34

## 2020-10-16 RX ADMIN — Medication 600 MILLIGRAM(S): at 00:20

## 2020-10-16 RX ADMIN — Medication 1 TABLET(S): at 00:59

## 2020-10-16 RX ADMIN — Medication 650 MILLIGRAM(S): at 03:34

## 2020-10-16 RX ADMIN — Medication 1 PATCH: at 01:00

## 2020-10-16 RX ADMIN — Medication 600 MILLIGRAM(S): at 02:29

## 2020-10-16 NOTE — ED PROVIDER NOTE - CLINICAL SUMMARY MEDICAL DECISION MAKING FREE TEXT BOX
49yo m pmhx right heel fracture w/ achilles repair 11/2019, current smoker 30+ pys, pw fall off ladder two days ago now today feels dizziness and has finger laceration. will update tetanus, ct head, xray hand, right ankle, analgesia prn, discuss with plastics, likely post concussive dizziness, discussed concussion precautions and pmd Follow up

## 2020-10-16 NOTE — ED PROVIDER NOTE - NS ED ROS FT
ROS:  GENERAL: No fever, no chills  EYES: no change in vision  HEENT: no trouble swallowing, no trouble speaking  CARDIAC: no chest pain  PULMONARY: no cough, no shortness of breath  GI: no abdominal pain, no nausea, no vomiting, no diarrhea, no constipation  : No dysuria, no frequency, no change in appearance, or odor of urine  SKIN: no rashes  NEURO: no headache, no weakness  MSK: per hpi   ~Ming Garrison D.O. -Resident

## 2020-10-16 NOTE — ED PROVIDER NOTE - OBJECTIVE STATEMENT
49yo m pmhx right heel fracture w/ achilles repair 11/2019, current smoker 30+ pys, pw fall off ladder two days ago now today feels dizziness and has finger laceration. reports injury happened 2 days ago, fell off ladder 10feet onto right side, right hand 5th digit fell into ladder rungs and cut it open also fell and hit head on floor. no loc, no ac use. went to [facility] yesterday and put staples in scalp laceration but left ama before any other testing could be done. now pw dizziness, and right 5th digit pain and laceration, and right ankle pain. right ankle chronic pain after injury last year but reports pain now slightly increased more than baseline and some swelling. usually ambulates with cane and can ambulate today as well similarly Denies other recent trauma, fevers, chills, headache, nausea, vomiting, dysuria, freq, hematuria, diarrhea, constipation, chest pain, shortness of breath, cough. unk last tetanus

## 2020-10-16 NOTE — ED PROVIDER NOTE - PMH
Procedure Scheduling Information    Procedure:  Colonoscopy  Last Procedure:  4/26/16  Procedure Date:  5/4/18  Procedure Time:  1030  Reason:  Hx polyps  Referring provider:  n/a  Location: [facility]   Prep:  MoviPrep  Sedation:  MAC     If MAC, why:  Multiple Comorbidities  Pacemaker/Defibrillator:  yes     Device Interrogation Form Faxed:  [facility] GARCIA  Anticoagulation:  Yes, 81 mg ASA   Prescribing Provider:                       H/O hernia repair    Inguinal hernia    Pelvic fracture

## 2020-10-16 NOTE — ED PROVIDER NOTE - CARE PROVIDER_API CALL
Micki Goodrich  PLASTIC SURGERY  [address] [place] [address], [address]  [place], [place] [id number]  Phone: [phone]  Fax: [phone]  Established Patient  Follow Up Time: Urgent

## 2020-10-16 NOTE — ED PROVIDER NOTE - PHYSICAL EXAMINATION
Physical Exam:  Gen: NAD, AOx3, non-toxic appearing  Head: normal appearing, 2 staples in linear incision on right occipital scalp region   HEENT: normal conjunctiva, oral mucosa moist  Lung:  no respiratory distress, speaking in full sentences, clear to ascultation bilaterally     CV: regular rate and rhythm, no chest wall ttp   Abd: soft, ND, NT, pelvis stable   MSK: no neck or spinal ttp, FROM of neck, LUE wnl FROM no ttp, RUE scattered abrasions, 3rd digit small superficial laceration 0.5mm dorsal surface, and 5th digit laceration to distal digit without clear evidence of displacement or nailbed injury, approximating well, no purulent discharge or erythema surrounding, normal strength rom and sensation, normal  strength, rle swelling mild ttp along lateral portion of foot normal rom as per patient otherwise   Neuro: No focal deficits  Skin: Warm  Psych: normal affect  ~Ming Garrison D.O. -Resident

## 2020-10-16 NOTE — ED PROCEDURE NOTE - ATTENDING CONTRIBUTION TO CARE
DR. ONEILL, ATTENDING MD-  I was in the exam room and observed and supervised the resident when they were completing the key portions of this procedure.

## 2020-10-16 NOTE — ED PROVIDER NOTE - PATIENT PORTAL LINK FT
You can access the FollowMyHealth Patient Portal offered by [facility] by registering at the following website: [email or web address]. By joining [employer]s FollowMyHealth portal, you will also be able to view your health information using other applications (apps) compatible with our system.

## 2020-10-16 NOTE — ED PROVIDER NOTE - CARE PLAN
Principal Discharge DX:	CHI (closed head injury), initial encounter  Secondary Diagnosis:	Finger laceration, initial encounter  Secondary Diagnosis:	Acute right ankle pain

## 2020-10-16 NOTE — ED PROVIDER NOTE - PROGRESS NOTE DETAILS
discussed with Dr. Goodrich plastics, patient will not be able to have lacerations repaired with timing >24hours, will need augmentin, dc with rapid followup in office tomorrow, can irrigate in ed Patient feels well, tolerating PO. Discussed lab and radiology findings with patient. Patient feels comfortable going home. Gave home care and follow up instructions. Discussed which symptoms to look out for and when to return to the ED for further evaluation. Patient given opportunity to ask questions about their medical condition and had all questions answered.

## 2020-10-16 NOTE — ED PROVIDER NOTE - ATTENDING CONTRIBUTION TO CARE
50- year old male fell off ladder. concern for ich vs concussion vs subdural . will check labs. pain control reassess. also with finger lac, open for more than 24 hours

## 2020-10-16 NOTE — ED PROVIDER NOTE - NSFOLLOWUPINSTRUCTIONS_ED_ALL_ED_FT
Closed Head Injury    A closed head injury is an injury to your head that may or may not involve a traumatic brain injury (TBI). Symptoms of TBI can be short or long lasting and include headache, dizziness, interference with memory or speech, fatigue, confusion, changes in sleep, mood changes, nausea, depression/anxiety, and dulling of senses. Make sure to obtain proper rest which includes getting plenty of sleep, avoiding excessive visual stimulation, and avoiding activities that may cause physical or mental stress. Avoid any situation where there is potential for another head injury, including sports.    SEEK IMMEDIATE MEDICAL CARE IF YOU HAVE ANY OF THE FOLLOWING SYMPTOMS: unusual drowsiness, vomiting, severe dizziness, seizures, lightheadedness, muscular weakness, different pupil sizes, visual changes, or clear or bloody discharge from your ears or nose.    Laceration     A laceration is a cut that goes through all of the layers of the skin and into the tissue that is right under the skin. Some lacerations heal on their own. Others need to be closed with skin adhesive strips, skin glue, stitches (sutures), or staples. Proper laceration care minimizes the risk of infection and helps the laceration to heal better.  If non-absorbable stitches or staples have been placed, they must be taken out within the time frame instructed by your healthcare provider.    SEEK IMMEDIATE MEDICAL CARE IF YOU HAVE ANY OF THE FOLLOWING SYMPTOMS: swelling around the wound, worsening pain, drainage from the wound, red streaking going away from your wound, inability to move finger or toe near the laceration, or discoloration of skin near the laceration.    Sutures, Staples, or Adhesive Wound Closure    Doctors use stitches (sutures), staples, and glue (skin adhesives) to hold your skin together while it heals (wound closure). What your doctor uses depends on your wound.     In most cases, your wound will be closed right away (primary skin closure). Sometimes it may be closed later so that it can be cleaned and then heal naturally (delayed wound closure).    What are the types of wound closure?    Adhesive glue     To use adhesive glue, your doctor: Holds the edges of the wound together, Paints the glue onto your skin. You may need more than one layer, Covers your wound with a bandage (dressing) after the glue is dry  Adhesive glue may be used for: Wounds that are not deep (superficial wounds), Wounds on the face, Children's wounds.  Adhesive glue is not used inside of wounds, or on wounds that are: Deep. Uneven. Bleeding.   Some benefits of adhesive glue are: It leaves nothing that needs to be removed, You do not need medicine to numb the area, You have less pain than with other types of closure, Adhesive strips   Adhesive strips are: Made of paper that is sticky (adhesive) and has many small holes it in (porous), Placed across your wound edges, like a normal bandage, Used to close very shallow wounds, Sometimes used with sutures to help improve closure.    Sutures    Sutures come in many different materials, strengths, and sizes. Some sutures break down as your wound heals (absorbable). Other sutures need to be removed (nonabsorbable).  To use sutures, your doctor: Sews your skin together with sutures and a needle. May use one long (continuous) stitch or separate stitches. Ties and cuts the sutures at the end.   Sutures can be used for all types of wounds, including under the skin. They can cause a skin reaction that can lead to infection.    Staples    Staples are often used to close surgical cuts (incisions). To use staples, your doctor: Holds the edges of your wound close together. Places a staple across the wound. Uses a tool to secure the staple to the skin. Repeats this with as many staples as needed.  Staples are faster to use than sutures, and they cause less reaction from your skin. Staples need to be removed using a tool that bends the staples away from your skin.    Follow these instructions at home:    Medicines     Take over-the-counter and prescription medicines only as told by your doctor.  If you were prescribed an antibiotic medicine, take it as told by your doctor. Do not stop taking the antibiotic even if you start to feel better    Wound care     Follow instructions from your doctor about how to take care of your wound and bandage.    Wash your hands with soap and water before and after touching your wound or bandage. If you cannot use soap and water, use hand .  Do not try to remove your wound closures unless your doctor tells you to do that. You may need a follow-up visit for your doctor to remove your closures.  Closures may stay in place for 2 weeks or longer.  Absorbable sutures may break down after a few days or weeks.  If adhesive strip edges start to loosen and curl up, you may trim the loose edges.  Do not pick at your wound. Picking can cause an infection.  Apply ointments or creams only as told by your doctor.    Check your wound every day for signs of infection. Check for:  Redness, swelling, or pain.  Fluid or blood.  Warmth.  Pus or a bad smell.    General instructions     You may take showers with soap and water.   Do not take baths, swim, or use a hot tub until your doctor approves.   Do not soak your wound in water.  Keep all follow-up visits as told by your doctor. This is important.    Contact a doctor if you have any of the following:  A fever.   Chills.   Redness, swelling, or pain around your wound.  Fluid or blood coming from your wound.  Pus or a bad smell coming from your wound.  Notice that your wound feels warm to the touch.  Notice that the edges of your wound start to separate after your sutures come out.  Notice that your wound becomes thick, raised, and darker in color after your sutures come out (scarring).    Summary    What your doctor uses to hold your skin together while it heals (wound closure) depends on your wound.  Your doctor may use stitches (sutures), staples, and glue (skin adhesives).  Do not try to remove your wound closures unless your doctor tells you to do that.  Do not soak your wound in water.  This information is not intended to replace advice given to you by your health care provider.   Make sure you discuss any questions you have with your health care provider.    1. TAKE ALL MEDICATIONS AS DIRECTED.    2. FOR PAIN OR FEVER YOU CAN TAKE IBUPROFEN (MOTRIN, ADVIL) OR ACETAMINOPHEN (TYLENOL) AS NEEDED, AS DIRECTED ON PACKAGING.  3. FOLLOW UP WITH YOUR PRIMARY DOCTOR WITHIN 5 DAYS AS DIRECTED, PLEASE FOLLOWUP WITH Dr. Goodrich, tomorrow for further evaluation of your finger and continue to take antibiotics as directed   4. IF YOU HAD LABS OR IMAGING DONE, YOU WERE GIVEN COPIES OF ALL LABS AND/OR IMAGING RESULTS FROM YOUR ER VISIT--PLEASE TAKE THEM WITH YOU TO YOUR FOLLOW UP APPOINTMENTS.  5. RETURN TO THE ER FOR ANY WORSENING SYMPTOMS OR CONCERNS. Closed Head Injury    A closed head injury is an injury to your head that may or may not involve a traumatic brain injury (TBI). Symptoms of TBI can be short or long lasting and include headache, dizziness, interference with memory or speech, fatigue, confusion, changes in sleep, mood changes, nausea, depression/anxiety, and dulling of senses. Make sure to obtain proper rest which includes getting plenty of sleep, avoiding excessive visual stimulation, and avoiding activities that may cause physical or mental stress. Avoid any situation where there is potential for another head injury, including sports.    SEEK IMMEDIATE MEDICAL CARE IF YOU HAVE ANY OF THE FOLLOWING SYMPTOMS: unusual drowsiness, vomiting, severe dizziness, seizures, lightheadedness, muscular weakness, different pupil sizes, visual changes, or clear or bloody discharge from your ears or nose.    Laceration     A laceration is a cut that goes through all of the layers of the skin and into the tissue that is right under the skin. Some lacerations heal on their own. Others need to be closed with skin adhesive strips, skin glue, stitches (sutures), or staples. Proper laceration care minimizes the risk of infection and helps the laceration to heal better.  If non-absorbable stitches or staples have been placed, they must be taken out within the time frame instructed by your healthcare provider.    SEEK IMMEDIATE MEDICAL CARE IF YOU HAVE ANY OF THE FOLLOWING SYMPTOMS: swelling around the wound, worsening pain, drainage from the wound, red streaking going away from your wound, inability to move finger or toe near the laceration, or discoloration of skin near the laceration.    Sutures, Staples, or Adhesive Wound Closure    Doctors use stitches (sutures), staples, and glue (skin adhesives) to hold your skin together while it heals (wound closure). What your doctor uses depends on your wound.     In most cases, your wound will be closed right away (primary skin closure). Sometimes it may be closed later so that it can be cleaned and then heal naturally (delayed wound closure).    What are the types of wound closure?    Adhesive glue     To use adhesive glue, your doctor: Holds the edges of the wound together, Paints the glue onto your skin. You may need more than one layer, Covers your wound with a bandage (dressing) after the glue is dry  Adhesive glue may be used for: Wounds that are not deep (superficial wounds), Wounds on the face, Children's wounds.  Adhesive glue is not used inside of wounds, or on wounds that are: Deep. Uneven. Bleeding.   Some benefits of adhesive glue are: It leaves nothing that needs to be removed, You do not need medicine to numb the area, You have less pain than with other types of closure, Adhesive strips   Adhesive strips are: Made of paper that is sticky (adhesive) and has many small holes it in (porous), Placed across your wound edges, like a normal bandage, Used to close very shallow wounds, Sometimes used with sutures to help improve closure.    Sutures    Sutures come in many different materials, strengths, and sizes. Some sutures break down as your wound heals (absorbable). Other sutures need to be removed (nonabsorbable).  To use sutures, your doctor: Sews your skin together with sutures and a needle. May use one long (continuous) stitch or separate stitches. Ties and cuts the sutures at the end.   Sutures can be used for all types of wounds, including under the skin. They can cause a skin reaction that can lead to infection.    Staples    Staples are often used to close surgical cuts (incisions). To use staples, your doctor: Holds the edges of your wound close together. Places a staple across the wound. Uses a tool to secure the staple to the skin. Repeats this with as many staples as needed.  Staples are faster to use than sutures, and they cause less reaction from your skin. Staples need to be removed using a tool that bends the staples away from your skin.    Follow these instructions at home:    Medicines     Take over-the-counter and prescription medicines only as told by your doctor.  If you were prescribed an antibiotic medicine, take it as told by your doctor. Do not stop taking the antibiotic even if you start to feel better    Wound care     Follow instructions from your doctor about how to take care of your wound and bandage.    Wash your hands with soap and water before and after touching your wound or bandage. If you cannot use soap and water, use hand .  Do not try to remove your wound closures unless your doctor tells you to do that. You may need a follow-up visit for your doctor to remove your closures.  Closures may stay in place for 2 weeks or longer.  Absorbable sutures may break down after a few days or weeks.  If adhesive strip edges start to loosen and curl up, you may trim the loose edges.  Do not pick at your wound. Picking can cause an infection.  Apply ointments or creams only as told by your doctor.    Check your wound every day for signs of infection. Check for:  Redness, swelling, or pain.  Fluid or blood.  Warmth.  Pus or a bad smell.    General instructions     You may take showers with soap and water.   Do not take baths, swim, or use a hot tub until your doctor approves.   Do not soak your wound in water.  Keep all follow-up visits as told by your doctor. This is important.    Contact a doctor if you have any of the following:  A fever.   Chills.   Redness, swelling, or pain around your wound.  Fluid or blood coming from your wound.  Pus or a bad smell coming from your wound.  Notice that your wound feels warm to the touch.  Notice that the edges of your wound start to separate after your sutures come out.  Notice that your wound becomes thick, raised, and darker in color after your sutures come out (scarring).    Summary    What your doctor uses to hold your skin together while it heals (wound closure) depends on your wound.  Your doctor may use stitches (sutures), staples, and glue (skin adhesives).  Do not try to remove your wound closures unless your doctor tells you to do that.  Do not soak your wound in water.  This information is not intended to replace advice given to you by your health care provider.   Make sure you discuss any questions you have with your health care provider.    1. TAKE ALL MEDICATIONS AS DIRECTED.    2. FOR PAIN OR FEVER YOU CAN TAKE IBUPROFEN (MOTRIN, ADVIL) OR ACETAMINOPHEN (TYLENOL) AS NEEDED, AS DIRECTED ON PACKAGING.  3. FOLLOW UP WITH YOUR PRIMARY DOCTOR WITHIN 5 DAYS AS DIRECTED, PLEASE FOLLOWUP WITH Dr. Goodrich, tomorrow for further evaluation of your finger and continue to take antibiotics as directed   4. IF YOU HAD LABS OR IMAGING DONE, YOU WERE GIVEN COPIES OF ALL LABS AND/OR IMAGING RESULTS FROM YOUR ER VISIT--PLEASE TAKE THEM WITH YOU TO YOUR FOLLOW UP APPOINTMENTS.  5. RETURN TO THE ER FOR ANY WORSENING SYMPTOMS OR CONCERNS.    FINGER FRACTURE - General Information           Finger Fracture    WHAT YOU NEED TO KNOW:    What is a finger fracture? A finger fracture is a break in one or more of the bones in your finger.    What are the signs and symptoms of a finger fracture?   •Pain, bruising, or swelling      •Weakness or numbness      •Trouble moving your finger      •Finger shape is not normal      How is a finger fracture diagnosed and treated? Your healthcare provider will examine you and ask about your injury. An x-ray may be used to check for a break or other damage. Treatment may include any of the following:   •A cast or splint helps prevent movement and protects your finger so it can heal.      •NSAIDs, such as ibuprofen, help decrease swelling, pain, and fever. This medicine is available with or without a doctor's order. NSAIDs can cause stomach bleeding or kidney problems in certain people. If you take blood thinner medicine, always ask your healthcare provider if NSAIDs are safe for you. Always read the medicine label and follow directions.      •Acetaminophen decreases pain and fever. It is available without a doctor's order. Ask how much to take and how often to take it. Follow directions. Read the labels of all other medicines you are using to see if they also contain acetaminophen, or ask your doctor or pharmacist. Acetaminophen can cause liver damage if not taken correctly. Do not use more than 4 grams (4,000 milligrams) total of acetaminophen in one day.       •Prescription pain medicine may be given. Ask your healthcare provider how to take this medicine safely. Some prescription pain medicines contain acetaminophen. Do not take other medicines that contain acetaminophen without talking to your healthcare provider. Too much acetaminophen may cause liver damage. Prescription pain medicine may cause constipation. Ask your healthcare provider how to prevent or treat constipation.       •Closed reduction is used to put your bone back into the correct position without surgery.      •Open reduction surgery may be needed to put your bone back into the correct position. Wires, pins, plates, or screws may be used to keep the broken pieces lined up correctly.      How can I manage my symptoms?   •Wear your splint as directed. Do not remove your splint until you follow up with your healthcare provider or hand specialist.      •Apply ice on your finger for 15 to 20 minutes every hour or as directed. Use an ice pack, or put crushed ice in a plastic bag. Cover it with a towel before you apply it to your skin. Ice helps prevent tissue damage and decreases swelling and pain.      •Elevate your finger above the level of your heart as often as you can. This will help decrease swelling and pain. Prop your hand on pillows or blankets to keep it elevated comfortably.             •Go to physical therapy as directed. A physical therapist teaches you exercises to help improve movement and strength, and to decrease pain.      When should I seek immediate care?   •Your cast or splint gets wet, damaged, or comes off.      •Your splint or cast feels too tight.      •You have severe pain.      •Your injured finger is numb, cold, or pale.      When should I call my doctor or hand specialist?   •Your pain or swelling gets worse, even after treatment.      •You have questions or concerns about your condition or care.      CARE AGREEMENT:    You have the right to help plan your care. Learn about your health condition and how it may be treated. Discuss treatment options with your healthcare providers to decide what care you want to receive. You always have the right to refuse treatment.

## 2020-10-16 NOTE — ED ADULT NURSE REASSESSMENT NOTE - NS ED NURSE REASSESS COMMENT FT1
pt received from LAI Kumar. pt return from CT. VS as noted, in No acute distress., awaiting results and dispo. will continue to monitor pt.

## 2020-10-20 NOTE — ED POST DISCHARGE NOTE - DETAILS
Called patient, unable to be reached, left message 10/17 Called patient, unable to be reached, no message left 10/18 spoke with pt's emergency contact, melody, patient feeling better finger doing well, augmentin sent to pharmacy, hasn't followed up with plastics yet but will give a call today and callback if needing anymore information

## 2021-01-27 ENCOUNTER — EMERGENCY (EMERGENCY)
Facility: HOSPITAL | Age: 51
LOS: 1 days | Discharge: HOME | End: 2021-01-27
Attending: EMERGENCY MEDICINE
Payer: MEDICAID

## 2021-01-27 VITALS
DIASTOLIC BLOOD PRESSURE: 93 MMHG | SYSTOLIC BLOOD PRESSURE: 138 MMHG | HEART RATE: 102 BPM | TEMPERATURE: 98 F | RESPIRATION RATE: 18 BRPM | OXYGEN SATURATION: 96 %

## 2021-01-27 VITALS
RESPIRATION RATE: 16 BRPM | HEIGHT: 71 IN | SYSTOLIC BLOOD PRESSURE: 137 MMHG | DIASTOLIC BLOOD PRESSURE: 96 MMHG | OXYGEN SATURATION: 98 % | TEMPERATURE: 99 F | HEART RATE: 98 BPM | WEIGHT: 187.39 LBS

## 2021-01-27 LAB
ALBUMIN SERPL ELPH-MCNC: 4.1 G/DL — SIGNIFICANT CHANGE UP (ref 3.5–5)
ALP SERPL-CCNC: 86 U/L — SIGNIFICANT CHANGE UP (ref 40–120)
ALT FLD-CCNC: 42 U/L DA — SIGNIFICANT CHANGE UP (ref 10–60)
ANION GAP SERPL CALC-SCNC: 7 MMOL/L — SIGNIFICANT CHANGE UP (ref 5–17)
APPEARANCE UR: CLEAR — SIGNIFICANT CHANGE UP
AST SERPL-CCNC: 22 U/L — SIGNIFICANT CHANGE UP (ref 10–40)
BASOPHILS # BLD AUTO: 0.06 K/UL — SIGNIFICANT CHANGE UP (ref 0–0.2)
BASOPHILS NFR BLD AUTO: 0.4 % — SIGNIFICANT CHANGE UP (ref 0–2)
BILIRUB SERPL-MCNC: 0.5 MG/DL — SIGNIFICANT CHANGE UP (ref 0.2–1.2)
BILIRUB UR-MCNC: NEGATIVE — SIGNIFICANT CHANGE UP
BUN SERPL-MCNC: 14 MG/DL — SIGNIFICANT CHANGE UP (ref 7–18)
CALCIUM SERPL-MCNC: 9.1 MG/DL — SIGNIFICANT CHANGE UP (ref 8.4–10.5)
CHLORIDE SERPL-SCNC: 105 MMOL/L — SIGNIFICANT CHANGE UP (ref 96–108)
CO2 SERPL-SCNC: 28 MMOL/L — SIGNIFICANT CHANGE UP (ref 22–31)
COLOR SPEC: YELLOW — SIGNIFICANT CHANGE UP
CREAT SERPL-MCNC: 1.01 MG/DL — SIGNIFICANT CHANGE UP (ref 0.5–1.3)
DIFF PNL FLD: NEGATIVE — SIGNIFICANT CHANGE UP
EOSINOPHIL # BLD AUTO: 0.07 K/UL — SIGNIFICANT CHANGE UP (ref 0–0.5)
EOSINOPHIL NFR BLD AUTO: 0.5 % — SIGNIFICANT CHANGE UP (ref 0–6)
GLUCOSE SERPL-MCNC: 94 MG/DL — SIGNIFICANT CHANGE UP (ref 70–99)
GLUCOSE UR QL: NEGATIVE — SIGNIFICANT CHANGE UP
HCT VFR BLD CALC: 48.3 % — SIGNIFICANT CHANGE UP (ref 39–50)
HGB BLD-MCNC: 15.9 G/DL — SIGNIFICANT CHANGE UP (ref 13–17)
IMM GRANULOCYTES NFR BLD AUTO: 0.3 % — SIGNIFICANT CHANGE UP (ref 0–1.5)
KETONES UR-MCNC: NEGATIVE — SIGNIFICANT CHANGE UP
LACTATE SERPL-SCNC: 0.8 MMOL/L — SIGNIFICANT CHANGE UP (ref 0.7–2)
LEUKOCYTE ESTERASE UR-ACNC: NEGATIVE — SIGNIFICANT CHANGE UP
LIDOCAIN IGE QN: 120 U/L — SIGNIFICANT CHANGE UP (ref 73–393)
LYMPHOCYTES # BLD AUTO: 20.4 % — SIGNIFICANT CHANGE UP (ref 13–44)
LYMPHOCYTES # BLD AUTO: 3.1 K/UL — SIGNIFICANT CHANGE UP (ref 1–3.3)
MCHC RBC-ENTMCNC: 27.8 PG — SIGNIFICANT CHANGE UP (ref 27–34)
MCHC RBC-ENTMCNC: 32.9 GM/DL — SIGNIFICANT CHANGE UP (ref 32–36)
MCV RBC AUTO: 84.6 FL — SIGNIFICANT CHANGE UP (ref 80–100)
MONOCYTES # BLD AUTO: 0.99 K/UL — HIGH (ref 0–0.9)
MONOCYTES NFR BLD AUTO: 6.5 % — SIGNIFICANT CHANGE UP (ref 2–14)
NEUTROPHILS # BLD AUTO: 10.89 K/UL — HIGH (ref 1.8–7.4)
NEUTROPHILS NFR BLD AUTO: 71.9 % — SIGNIFICANT CHANGE UP (ref 43–77)
NITRITE UR-MCNC: NEGATIVE — SIGNIFICANT CHANGE UP
NRBC # BLD: 0 /100 WBCS — SIGNIFICANT CHANGE UP (ref 0–0)
PH UR: 6.5 — SIGNIFICANT CHANGE UP (ref 5–8)
PLATELET # BLD AUTO: 309 K/UL — SIGNIFICANT CHANGE UP (ref 150–400)
POTASSIUM SERPL-MCNC: 4.1 MMOL/L — SIGNIFICANT CHANGE UP (ref 3.5–5.3)
POTASSIUM SERPL-SCNC: 4.1 MMOL/L — SIGNIFICANT CHANGE UP (ref 3.5–5.3)
PROT SERPL-MCNC: 7.8 G/DL — SIGNIFICANT CHANGE UP (ref 6–8.3)
PROT UR-MCNC: NEGATIVE — SIGNIFICANT CHANGE UP
RBC # BLD: 5.71 M/UL — SIGNIFICANT CHANGE UP (ref 4.2–5.8)
RBC # FLD: 13.7 % — SIGNIFICANT CHANGE UP (ref 10.3–14.5)
SODIUM SERPL-SCNC: 140 MMOL/L — SIGNIFICANT CHANGE UP (ref 135–145)
SP GR SPEC: 1.01 — SIGNIFICANT CHANGE UP (ref 1.01–1.02)
UROBILINOGEN FLD QL: NEGATIVE — SIGNIFICANT CHANGE UP
WBC # BLD: 15.16 K/UL — HIGH (ref 3.8–10.5)
WBC # FLD AUTO: 15.16 K/UL — HIGH (ref 3.8–10.5)

## 2021-01-27 PROCEDURE — 80053 COMPREHEN METABOLIC PANEL: CPT

## 2021-01-27 PROCEDURE — 74177 CT ABD & PELVIS W/CONTRAST: CPT | Mod: 26

## 2021-01-27 PROCEDURE — 83690 ASSAY OF LIPASE: CPT

## 2021-01-27 PROCEDURE — 81003 URINALYSIS AUTO W/O SCOPE: CPT

## 2021-01-27 PROCEDURE — 74177 CT ABD & PELVIS W/CONTRAST: CPT

## 2021-01-27 PROCEDURE — 36415 COLL VENOUS BLD VENIPUNCTURE: CPT

## 2021-01-27 PROCEDURE — 96374 THER/PROPH/DIAG INJ IV PUSH: CPT | Mod: XU

## 2021-01-27 PROCEDURE — 99284 EMERGENCY DEPT VISIT MOD MDM: CPT | Mod: 25

## 2021-01-27 PROCEDURE — 99285 EMERGENCY DEPT VISIT HI MDM: CPT

## 2021-01-27 PROCEDURE — 83605 ASSAY OF LACTIC ACID: CPT

## 2021-01-27 PROCEDURE — 85025 COMPLETE CBC W/AUTO DIFF WBC: CPT

## 2021-01-27 RX ORDER — CIPROFLOXACIN LACTATE 400MG/40ML
1 VIAL (ML) INTRAVENOUS
Qty: 20 | Refills: 0
Start: 2021-01-27 | End: 2021-02-05

## 2021-01-27 RX ORDER — SODIUM CHLORIDE 9 MG/ML
1000 INJECTION INTRAMUSCULAR; INTRAVENOUS; SUBCUTANEOUS ONCE
Refills: 0 | Status: COMPLETED | OUTPATIENT
Start: 2021-01-27 | End: 2021-01-27

## 2021-01-27 RX ORDER — METRONIDAZOLE 500 MG
1 TABLET ORAL
Qty: 30 | Refills: 0
Start: 2021-01-27 | End: 2021-02-05

## 2021-01-27 RX ORDER — MORPHINE SULFATE 50 MG/1
4 CAPSULE, EXTENDED RELEASE ORAL ONCE
Refills: 0 | Status: DISCONTINUED | OUTPATIENT
Start: 2021-01-27 | End: 2021-01-27

## 2021-01-27 RX ADMIN — SODIUM CHLORIDE 1000 MILLILITER(S): 9 INJECTION INTRAMUSCULAR; INTRAVENOUS; SUBCUTANEOUS at 09:28

## 2021-01-27 RX ADMIN — MORPHINE SULFATE 4 MILLIGRAM(S): 50 CAPSULE, EXTENDED RELEASE ORAL at 09:28

## 2021-01-27 NOTE — ED PROVIDER NOTE - OBJECTIVE STATEMENT
50yoM with h/o R inguinal and ventral hernia repairs, no PMHx, presents with mid-L sided abdominal pain x 3 days, present only when he palpates it, nonradiating. Not improved with magnesium. Possible bloating. Denies v/d/c, fever, dys/hematuria, scrotal pain, and all other symptoms.

## 2021-01-27 NOTE — ED ADULT NURSE NOTE - ED STAT RN HANDOFF DETAILS
Patient discharged home as per MD order, IV assess removed no redness, swelling or bleeding noted at site. All discharge instructions and F/U visits provided to patient. Patient verbalizes understanding leaving ambulatory in no acute distress.

## 2021-01-27 NOTE — ED PROVIDER NOTE - CLINICAL SUMMARY MEDICAL DECISION MAKING FREE TEXT BOX
Character and appearance low suspicion for dissection, mesenteric ischemia, or torsion. Character/exam low suspicion for stone. Character and appearance low suspicion for dissection, mesenteric ischemia, or torsion. Character/exam low suspicion for stone. C/w diverticulitis, confirmed on CT. Of note pt eloped for a while then returned. Patient is well appearing, NAD, afebrile, hemodynamically stable. Any available tests and studies were discussed with patient. Discharged with abx, instructions in further symptomatic care, return precautions, and need for GI f/u.

## 2021-01-27 NOTE — ED ADULT NURSE NOTE - OBJECTIVE STATEMENT
Patient present to Ed with c/o LLQ pain for past 3 days. constant and sharp 10/10 tender to touch. patient thought he was constipated was has been having BM normal has hernia

## 2021-01-27 NOTE — ED PROVIDER NOTE - PHYSICAL EXAMINATION
Afebrile, hemodynamically stable, saturating well  NAD, well appearing, laying comfortably in bed  Head NCAT  EOMI grossly, anicteric  MMM  RRR, nml S1/S2, no m/r/g  Lungs CTAB, no w/r/r  Abd soft, ND, mild L sided abdominal TTP with no rebound or guarding, nml BS, no CVAT  Very small easily reduced L inguinal hernia  No testicular pain/swelling/erythema, +cremaster (Scribe Dilranie as chaperone)  AAO, CN's 3-12 grossly intact  BYRNES spontaneously, no leg cyanosis or edema  Skin warm, well perfused, no rashes or hives

## 2021-01-27 NOTE — ED PROVIDER NOTE - NSFOLLOWUPINSTRUCTIONS_ED_ALL_ED_FT
Please follow up with a GI doctor (such as Dr. Wiley) in 1-2 days.  Please use acetaminophen or ibuprofen as needed for pain.  Please take the antibiotics as prescribed.  Please keep well hydrated.  Please return to the emergency department if you have severe worsening pain, vomiting, fever, bloody stool, or any other symptoms.      Diverticulitis    WHAT YOU NEED TO KNOW:    Diverticulitis is a condition that causes small pockets along your intestine called diverticula to become inflamed or infected. This is caused by hard bowel movements, food, or bacteria that get stuck in the pockets.    DISCHARGE INSTRUCTIONS:    Return to the emergency department if:   •You have bowel movement or foul-smelling discharge leaking from your vagina or in your urine.      •You have severe diarrhea.      •You urinate less than usual or not at all.      •You are not able to have a bowel movement.      •You cannot stop vomiting.       •You have severe abdominal pain, a fever, and your abdomen is larger than usual.       •You have new or increased blood in your bowel movements.       Contact your healthcare provider if:   •You have pain when you urinate.      •Your symptoms get worse or do not go away.       •You have questions or concerns about your condition or care.       Medicines:   •Antibiotics may be given to help treat a bacterial infection.      •Prescription pain medicine may be given. Ask your healthcare provider how to take this medicine safely. Some prescription pain medicines contain acetaminophen. Do not take other medicines that contain acetaminophen without talking to your healthcare provider. Too much acetaminophen may cause liver damage. Prescription pain medicine may cause constipation. Ask your healthcare provider how to prevent or treat constipation.       •Take your medicine as directed. Contact your healthcare provider if you think your medicine is not helping or if you have side effects. Tell him or her if you are allergic to any medicine. Keep a list of the medicines, vitamins, and herbs you take. Include the amounts, and when and why you take them. Bring the list or the pill bottles to follow-up visits. Carry your medicine list with you in case of an emergency.      Clear liquid diet: A clear liquid diet includes any liquids that you can see through. Examples include water, ginger-conchita, cranberry or apple juice, frozen fruit ice, or broth. Stay on a clear liquid diet until your symptoms are gone, or as directed.     Follow up with your healthcare provider as directed: You may need to return for a colonoscopy. When your symptoms are gone, you may need a low-fat, high-fiber diet to prevent diverticulitis from developing again. Your healthcare provider or dietitian can help you create meal plans. Write down your questions so you remember to ask them during your visits.

## 2021-01-27 NOTE — ED PROVIDER NOTE - PATIENT PORTAL LINK FT
You can access the FollowMyHealth Patient Portal offered by [facility] by registering at the following website: [email or web address]. By joining [employer]’s FollowMyHealth portal, you will also be able to view your health information using other applications (apps) compatible with our system.

## 2021-01-29 NOTE — ED POST DISCHARGE NOTE - OTHER COMMUNICATION
Patient called, reports he has had a lot of gas and bloating, though pain is a lot better. wanted to know if this is normal. Advised to continue clear liquid diet. return to ED if pain gets worse or if he develops, fever, vomiting, or any new or concerning sx.

## 2024-02-12 NOTE — ED ADULT NURSE NOTE - CHPI ED NUR SYMPTOMS NEG
no abdominal distension/no blood in stool/no burning urination/no chills/no diarrhea/no dysuria/no fever/no hematuria/no nausea/no vomiting
None known
